# Patient Record
Sex: FEMALE | Race: WHITE | NOT HISPANIC OR LATINO | Employment: UNEMPLOYED | URBAN - METROPOLITAN AREA
[De-identification: names, ages, dates, MRNs, and addresses within clinical notes are randomized per-mention and may not be internally consistent; named-entity substitution may affect disease eponyms.]

---

## 2018-04-11 ENCOUNTER — HISTORICAL (OUTPATIENT)
Dept: SURGERY | Facility: HOSPITAL | Age: 58
End: 2018-04-11

## 2018-04-11 LAB
ABS NEUT (OLG): 2.4 X10(3)/MCL (ref 1.5–6.9)
ALBUMIN SERPL-MCNC: 3.7 GM/DL (ref 3.4–5)
ALBUMIN/GLOB SERPL: 0.9 RATIO
ALP SERPL-CCNC: 54 UNIT/L (ref 30–113)
ALT SERPL-CCNC: 122 UNIT/L (ref 10–45)
APTT PPP: 30.6 SECOND(S) (ref 25–35)
AST SERPL-CCNC: 68 UNIT/L (ref 15–37)
BASOPHILS # BLD AUTO: 0 X10(3)/MCL (ref 0–0.1)
BASOPHILS NFR BLD AUTO: 1 % (ref 0–1)
BILIRUB SERPL-MCNC: 0.4 MG/DL (ref 0.1–0.9)
BILIRUBIN DIRECT+TOT PNL SERPL-MCNC: 0.1 MG/DL (ref 0–0.3)
BILIRUBIN DIRECT+TOT PNL SERPL-MCNC: 0.3 MG/DL
BUN SERPL-MCNC: 10 MG/DL (ref 10–20)
CALCIUM SERPL-MCNC: 8.9 MG/DL (ref 8–10.5)
CHLORIDE SERPL-SCNC: 96 MMOL/L (ref 100–108)
CO2 SERPL-SCNC: 27 MMOL/L (ref 21–35)
CREAT SERPL-MCNC: 0.66 MG/DL (ref 0.7–1.3)
EOSINOPHIL # BLD AUTO: 0.2 X10(3)/MCL (ref 0–0.6)
EOSINOPHIL NFR BLD AUTO: 6 % (ref 0–5)
ERYTHROCYTE [DISTWIDTH] IN BLOOD BY AUTOMATED COUNT: 12.4 % (ref 11.5–17)
GLOBULIN SER-MCNC: 4 GM/DL
GLUCOSE SERPL-MCNC: 102 MG/DL (ref 75–116)
HCT VFR BLD AUTO: 38.6 % (ref 36–48)
HGB BLD-MCNC: 13 GM/DL (ref 12–16)
INR PPP: 1 (ref 0–1.2)
LYMPHOCYTES # BLD AUTO: 1.3 X10(3)/MCL (ref 0.5–4.1)
LYMPHOCYTES NFR BLD AUTO: 30.5 % (ref 15–40)
MCH RBC QN AUTO: 33 PG (ref 27–34)
MCHC RBC AUTO-ENTMCNC: 34 GM/DL (ref 31–36)
MCV RBC AUTO: 98 FL (ref 80–99)
MONOCYTES # BLD AUTO: 0.4 X10(3)/MCL (ref 0–1.1)
MONOCYTES NFR BLD AUTO: 9 % (ref 4–12)
NEUTROPHILS # BLD AUTO: 2.4 X10(3)/MCL (ref 1.5–6.9)
NEUTROPHILS NFR BLD AUTO: 54 % (ref 43–75)
PLATELET # BLD AUTO: 157 X10(3)/MCL (ref 140–400)
PMV BLD AUTO: 9.9 FL (ref 6.8–10)
POTASSIUM SERPL-SCNC: 4 MMOL/L (ref 3.6–5.2)
PROT SERPL-MCNC: 7.7 GM/DL (ref 6.4–8.2)
PROTHROMBIN TIME: 10.6 SECOND(S) (ref 9–12)
RBC # BLD AUTO: 3.96 X10(6)/MCL (ref 4.2–5.4)
SODIUM SERPL-SCNC: 134 MMOL/L (ref 135–145)
WBC # SPEC AUTO: 4.4 X10(3)/MCL (ref 4.5–11.5)

## 2018-04-12 ENCOUNTER — HISTORICAL (OUTPATIENT)
Dept: ANESTHESIOLOGY | Facility: HOSPITAL | Age: 58
End: 2018-04-12

## 2018-08-13 ENCOUNTER — HISTORICAL (OUTPATIENT)
Dept: RADIOLOGY | Facility: HOSPITAL | Age: 58
End: 2018-08-13

## 2018-09-11 ENCOUNTER — HISTORICAL (OUTPATIENT)
Dept: RADIOLOGY | Facility: HOSPITAL | Age: 58
End: 2018-09-11

## 2022-02-14 ENCOUNTER — HISTORICAL (OUTPATIENT)
Dept: LAB | Facility: HOSPITAL | Age: 62
End: 2022-02-14

## 2022-02-14 LAB
ABS NEUT (OLG): 2.2 (ref 1.5–6.9)
ALBUMIN SERPL-MCNC: 4.2 G/DL (ref 3.5–5)
ALBUMIN/GLOB SERPL: 1.3 {RATIO} (ref 1.1–2)
ALP SERPL-CCNC: 62 U/L (ref 40–150)
ALT SERPL-CCNC: 41 U/L (ref 0–55)
AST SERPL-CCNC: 29 U/L (ref 5–34)
BASOPHILS # BLD AUTO: 0 10*3/UL (ref 0–0.1)
BASOPHILS NFR BLD AUTO: 1 % (ref 0–1)
BILIRUB SERPL-MCNC: 0.5 MG/DL
BILIRUBIN DIRECT+TOT PNL SERPL-MCNC: 0.2 (ref 0–0.5)
BILIRUBIN DIRECT+TOT PNL SERPL-MCNC: 0.3 (ref 0–0.8)
BUN SERPL-MCNC: 8 MG/DL (ref 9.8–20.1)
CALCIUM SERPL-MCNC: 9.3 MG/DL (ref 8.7–10.5)
CHLORIDE SERPL-SCNC: 99 MMOL/L (ref 98–107)
CHOLEST SERPL-MCNC: 166 MG/DL
CHOLEST/HDLC SERPL: 4 {RATIO} (ref 0–5)
CO2 SERPL-SCNC: 23 MMOL/L (ref 22–29)
CREAT SERPL-MCNC: 0.77 MG/DL (ref 0.55–1.02)
DEPRECATED CALCIDIOL+CALCIFEROL SERPL-MC: 21.7 NG/ML (ref 30–80)
EOSINOPHIL # BLD AUTO: 0.1 10*3/UL (ref 0–0.6)
EOSINOPHIL NFR BLD AUTO: 2 % (ref 0–5)
ERYTHROCYTE [DISTWIDTH] IN BLOOD BY AUTOMATED COUNT: 13 % (ref 11.5–17)
EST. AVERAGE GLUCOSE BLD GHB EST-MCNC: 93.9 MG/DL
GLOBULIN SER-MCNC: 3.3 G/DL (ref 2.4–3.5)
GLUCOSE SERPL-MCNC: 96 MG/DL (ref 74–100)
HBA1C MFR BLD: 4.9 %
HCT VFR BLD AUTO: 40.6 % (ref 36–48)
HDLC SERPL-MCNC: 43 MG/DL (ref 35–60)
HGB BLD-MCNC: 13.9 G/DL (ref 12–16)
IMM GRANULOCYTES # BLD AUTO: 0.01 10*3/UL (ref 0–0.02)
IMM GRANULOCYTES NFR BLD AUTO: 0.2 % (ref 0–0.43)
LDLC SERPL CALC-MCNC: 101 MG/DL (ref 50–140)
LYMPHOCYTES # BLD AUTO: 1.8 10*3/UL (ref 0.5–4.1)
LYMPHOCYTES NFR BLD AUTO: 39 % (ref 15–40)
MANUAL DIFF? (OHS): NO
MCH RBC QN AUTO: 32 PG (ref 27–34)
MCHC RBC AUTO-ENTMCNC: 34 G/DL (ref 31–36)
MCV RBC AUTO: 92 FL (ref 80–99)
MONOCYTES # BLD AUTO: 0.5 10*3/UL (ref 0–1.1)
MONOCYTES NFR BLD AUTO: 10 % (ref 4–12)
NEUTROPHILS # BLD AUTO: 2.2 10*3/UL (ref 1.5–6.9)
NEUTROPHILS NFR BLD AUTO: 48 % (ref 43–75)
PLATELET # BLD AUTO: 153 10*3/UL (ref 140–400)
PMV BLD AUTO: 9.4 FL (ref 6.8–10)
POTASSIUM SERPL-SCNC: 4.5 MMOL/L (ref 3.5–5.1)
PROT SERPL-MCNC: 7.5 G/DL (ref 6.4–8.3)
RBC # BLD AUTO: 4.41 10*6/UL (ref 4.2–5.4)
SODIUM SERPL-SCNC: 131 MMOL/L (ref 136–145)
TRIGL SERPL-MCNC: 111 MG/DL (ref 37–140)
TSH SERPL-ACNC: 1.91 M[IU]/L (ref 0.35–4.94)
VLDLC SERPL CALC-MCNC: 22 MG/DL
WBC # SPEC AUTO: 4.5 10*3/UL (ref 4.5–11.5)

## 2022-04-26 DIAGNOSIS — R41.3 MEMORY DEFICIT: Primary | ICD-10-CM

## 2022-04-30 NOTE — OP NOTE
ADMITTING DIAGNOSIS:  Callus on the left 5th metatarsal region, plantar surface of the foot.    PROCEDURE PERFORMED:  Wide radical excision of callus on the left plantar surface near the 5th metatarsal consistent with probable hypertrophic callus.    POSTOPERATIVE DIAGNOSIS:  Successful excision of left 5th metatarsal callus.    HISTORY:  Patient is a 57-year-old female with right brain tumor who walks with inversion of her foot.  Patient has hepatitis C and underwent excision of a callus in 2015 of her left foot.  She has redeveloped a callus and now has an area of infection that is causing her pain and tenderness.  She needs debridement of the callus, and over the 5th metatarsal at the plantar surface.    PROCEDURE IN DETAIL:  The patient was brought to the operating room.  Local anesthetic, prepped and draped in a sterile fashion.  Had excision of the callus done with a 15 blade scalpel.  Hemostasis with Bovie cautery.  The entire callus and the deep-seated root of the callus were excised.  Full-thickness skin was excised.  Patient underwent wet-to-dry saline dressing.  Sterile dressings were applied.  No problems were encountered.  Patient tolerated the procedure well.     I appreciate the consultation referral from Dr. Brothers.        BBS/MODL   DD: 04/12/2018 1143   DT: 04/12/2018 1208  Job # [id number]    cc: Cong Brothers M.D.

## 2022-05-30 ENCOUNTER — OFFICE VISIT (OUTPATIENT)
Dept: FAMILY MEDICINE | Facility: CLINIC | Age: 62
End: 2022-05-30
Payer: MEDICAID

## 2022-05-30 VITALS
OXYGEN SATURATION: 96 % | TEMPERATURE: 98 F | BODY MASS INDEX: 25.54 KG/M2 | HEART RATE: 70 BPM | RESPIRATION RATE: 18 BRPM | WEIGHT: 138.81 LBS | SYSTOLIC BLOOD PRESSURE: 120 MMHG | HEIGHT: 62 IN | DIASTOLIC BLOOD PRESSURE: 69 MMHG

## 2022-05-30 DIAGNOSIS — Z09 FOLLOW-UP EXAM: Primary | ICD-10-CM

## 2022-05-30 DIAGNOSIS — R41.89 COGNITIVE DECLINE: ICD-10-CM

## 2022-05-30 DIAGNOSIS — H54.7 POOR VISION: ICD-10-CM

## 2022-05-30 DIAGNOSIS — J30.2 SEASONAL ALLERGIC RHINITIS, UNSPECIFIED TRIGGER: ICD-10-CM

## 2022-05-30 DIAGNOSIS — R35.0 URINARY FREQUENCY: ICD-10-CM

## 2022-05-30 DIAGNOSIS — G89.29 OTHER CHRONIC PAIN: ICD-10-CM

## 2022-05-30 DIAGNOSIS — E55.9 VITAMIN D DEFICIENCY: ICD-10-CM

## 2022-05-30 PROCEDURE — 99213 OFFICE O/P EST LOW 20 MIN: CPT | Mod: S$PBB,,, | Performed by: REGISTERED NURSE

## 2022-05-30 PROCEDURE — 3074F PR MOST RECENT SYSTOLIC BLOOD PRESSURE < 130 MM HG: ICD-10-PCS | Mod: CPTII,,, | Performed by: REGISTERED NURSE

## 2022-05-30 PROCEDURE — 99213 OFFICE O/P EST LOW 20 MIN: CPT | Mod: PBBFAC | Performed by: REGISTERED NURSE

## 2022-05-30 PROCEDURE — 3008F BODY MASS INDEX DOCD: CPT | Mod: CPTII,,, | Performed by: REGISTERED NURSE

## 2022-05-30 PROCEDURE — 3074F SYST BP LT 130 MM HG: CPT | Mod: CPTII,,, | Performed by: REGISTERED NURSE

## 2022-05-30 PROCEDURE — 99999 PR PBB SHADOW E&M-EST. PATIENT-LVL III: CPT | Mod: PBBFAC,,, | Performed by: REGISTERED NURSE

## 2022-05-30 PROCEDURE — 3078F PR MOST RECENT DIASTOLIC BLOOD PRESSURE < 80 MM HG: ICD-10-PCS | Mod: CPTII,,, | Performed by: REGISTERED NURSE

## 2022-05-30 PROCEDURE — 1160F PR REVIEW ALL MEDS BY PRESCRIBER/CLIN PHARMACIST DOCUMENTED: ICD-10-PCS | Mod: CPTII,,, | Performed by: REGISTERED NURSE

## 2022-05-30 PROCEDURE — 1159F MED LIST DOCD IN RCRD: CPT | Mod: CPTII,,, | Performed by: REGISTERED NURSE

## 2022-05-30 PROCEDURE — 1159F PR MEDICATION LIST DOCUMENTED IN MEDICAL RECORD: ICD-10-PCS | Mod: CPTII,,, | Performed by: REGISTERED NURSE

## 2022-05-30 PROCEDURE — 3078F DIAST BP <80 MM HG: CPT | Mod: CPTII,,, | Performed by: REGISTERED NURSE

## 2022-05-30 PROCEDURE — 99999 PR PBB SHADOW E&M-EST. PATIENT-LVL III: ICD-10-PCS | Mod: PBBFAC,,, | Performed by: REGISTERED NURSE

## 2022-05-30 PROCEDURE — 99213 PR OFFICE/OUTPT VISIT, EST, LEVL III, 20-29 MIN: ICD-10-PCS | Mod: S$PBB,,, | Performed by: REGISTERED NURSE

## 2022-05-30 PROCEDURE — 3008F PR BODY MASS INDEX (BMI) DOCUMENTED: ICD-10-PCS | Mod: CPTII,,, | Performed by: REGISTERED NURSE

## 2022-05-30 PROCEDURE — 1160F RVW MEDS BY RX/DR IN RCRD: CPT | Mod: CPTII,,, | Performed by: REGISTERED NURSE

## 2022-05-30 RX ORDER — IBUPROFEN 600 MG/1
600 TABLET ORAL EVERY 8 HOURS PRN
Qty: 30 TABLET | Refills: 1 | Status: CANCELLED | OUTPATIENT
Start: 2022-05-30

## 2022-05-30 RX ORDER — TRAZODONE HYDROCHLORIDE 50 MG/1
50 TABLET ORAL DAILY
COMMUNITY
Start: 2022-05-17 | End: 2022-10-05 | Stop reason: SDUPTHER

## 2022-05-30 RX ORDER — LEVOCETIRIZINE DIHYDROCHLORIDE 5 MG/1
5 TABLET, FILM COATED ORAL NIGHTLY
COMMUNITY
Start: 2022-05-17 | End: 2022-05-30 | Stop reason: ALTCHOICE

## 2022-05-30 RX ORDER — OXYBUTYNIN CHLORIDE 5 MG/1
5 TABLET, EXTENDED RELEASE ORAL EVERY MORNING
COMMUNITY
Start: 2022-05-17 | End: 2022-10-05 | Stop reason: SDUPTHER

## 2022-05-30 RX ORDER — IBUPROFEN 600 MG/1
600 TABLET ORAL EVERY 8 HOURS PRN
Qty: 60 TABLET | Refills: 1 | Status: SHIPPED | OUTPATIENT
Start: 2022-05-30 | End: 2022-07-29

## 2022-05-30 RX ORDER — MONTELUKAST SODIUM 10 MG/1
10 TABLET ORAL DAILY
COMMUNITY
Start: 2022-03-08 | End: 2022-05-30 | Stop reason: ALTCHOICE

## 2022-05-30 RX ORDER — IBUPROFEN 600 MG/1
600 TABLET ORAL DAILY
COMMUNITY
Start: 2022-03-08 | End: 2022-05-30

## 2022-05-30 RX ORDER — CETIRIZINE HYDROCHLORIDE 10 MG/1
10 TABLET ORAL DAILY
Qty: 30 TABLET | Refills: 3 | Status: SHIPPED | OUTPATIENT
Start: 2022-05-30 | End: 2022-10-05 | Stop reason: SDUPTHER

## 2022-05-30 NOTE — PROGRESS NOTES
Subjective:       Patient ID: Avelina David is a 62 y.o. female.    Chief Complaint: 4mth f/u and sinus drainage     Patient is a 62-year-old female here today for follow-up visit.  Patient complaining of left foot pain x1 month.  Patient stated plantar wart may have returned.  Patient denies injury or trauma to the area, patient rates pain 5/10 on pain scale and pain is relieved with Motrin.     Review of Systems   Constitutional: Negative.    HENT: Negative.    Eyes: Negative.    Respiratory: Negative.    Cardiovascular: Negative.    Gastrointestinal: Negative.    Endocrine: Negative.    Genitourinary: Positive for frequency.   Musculoskeletal: Positive for arthralgias and gait problem.   Integumentary:  Negative.   Allergic/Immunologic: Positive for environmental allergies.   Neurological: Positive for memory loss.   Hematological: Negative.    Psychiatric/Behavioral: Negative.          Objective:      Physical Exam  Constitutional:       Appearance: Normal appearance.   HENT:      Head: Normocephalic and atraumatic.      Nose: Nose normal.      Mouth/Throat:      Mouth: Mucous membranes are moist.   Eyes:      Pupils: Pupils are equal, round, and reactive to light.   Cardiovascular:      Rate and Rhythm: Normal rate and regular rhythm.      Pulses: Normal pulses.      Heart sounds: Normal heart sounds.   Pulmonary:      Effort: Pulmonary effort is normal.      Breath sounds: Normal breath sounds.   Abdominal:      General: Abdomen is flat. Bowel sounds are normal.      Palpations: Abdomen is soft.   Musculoskeletal:         General: Tenderness present.      Cervical back: Normal range of motion and neck supple.   Skin:     General: Skin is warm and dry.      Capillary Refill: Capillary refill takes less than 2 seconds.   Neurological:      Mental Status: She is alert. Mental status is at baseline.   Psychiatric:         Mood and Affect: Mood normal.         Behavior: Behavior normal.         Assessment:        Problem List Items Addressed This Visit        Neuro    Cognitive decline    Relevant Orders    Sodium    Chronic pain       Ophtho    Poor vision       Renal/    Urinary frequency       Endocrine    Vitamin D deficiency      Other Visit Diagnoses     Follow-up exam    -  Primary    Seasonal allergic rhinitis, unspecified trigger              Plan:   Zyrtec 10 mg p.o. q.day since the pharmacy.  Patient refused mammogram and colonoscopy at this time.  Next neurologist appointment is on July 1st, instructed patient to keep scheduled appointment.  Motrin 600 mg p.o. Q 8 hours p.r.n. for pain sent to pharmacy.  Patient stated she will call and schedule appointment with  for left foot pain.  Sodium level ordered at this visit, will call patient with results.  Return to clinic p.r.n./6 months.

## 2022-06-24 ENCOUNTER — TELEPHONE (OUTPATIENT)
Dept: NEUROLOGY | Facility: CLINIC | Age: 62
End: 2022-06-24
Payer: MEDICAID

## 2022-06-24 NOTE — TELEPHONE ENCOUNTER
----- Message from Leelee Quiñonez sent at 6/23/2022  2:14 PM CDT -----  Regarding: records included with referral  Pt's caregiver Allyssa [phone] called to verify appt and asked if the referring provider had records of the pt's brain tumor included with referral. I am not seeing anything in referral in appt area. Please verify that all information is included as they don't want have to come here for appt and then wait on more records.         Thank You  Nessa MCCLENDON

## 2022-06-30 PROBLEM — M81.0 OSTEOPOROSIS: Status: ACTIVE | Noted: 2022-06-30

## 2022-06-30 PROBLEM — Z72.0 TOBACCO USER: Status: ACTIVE | Noted: 2022-06-30

## 2022-06-30 NOTE — PROGRESS NOTES
[facility] Neurology initial Office Visit Note    Initial Visit Date:  July 1, 2022  Current Visit Date:  07/01/2022      Chief Complaint:     Chief Complaint   Patient presents with    Cognitive Decline     Patient is having trouble walking foot is continuing to turn       History of Present Illness:      This is a 62 y.o. 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  Caretaker noted that she has been having trouble with remembering appointments and misplacing things since her surgery.  Patient noted she had 1 seizure post resection but that has not recurred.  Today, her main complaint is that she is unable to walk on her left leg due to spasticity ever since the surgery.  She reports severe muscle cramps in her left leg.    Age of Onset: since resection     Progression: keeping up with appointments, misplacing things. Denied confusing names and faces.     Instrumental Activities of Daily Living (IADL)  Cooking: Partial assistance  Cleaning: Partial assistance  Taking Medications: Partial assistance  Transportation: Total assistance  Laundry:  Partial assistance  Shopping:  Partial assistance  Communications: Partial assistance    Basic Activities of Daily Living (ADL)  Eating: Self-care  Bathing: Partial assistance  Dressing: Partial assistance  Transferring: Self-care  Toiletry/Personal Hygiene: Self-care  Ambulation Status: Partial assistance    Medications:     non    Labs:     Results for orders placed or performed in visit on 02/14/22   CBC Auto Differential   Result Value Ref Range    WBC 4.5 4.5 - 11.5    RBC 4.41 4.20 - 5.40    Hgb 13.9 12.0 - 16.0    Hct 40.6 36.0 - 48.0    MCV 92 80 - 99    MCH 32 27 - 34    MCHC 34 31 - 36    RDW 13.0 11.5 - 17.0    Platelet 153 140 - 400    MPV 9.4 6.8 - 10.0    Abs Neut 2.2 1.5 - 6.9    Manual Diff? No    Differential   Result Value Ref Range    Neut % 48 43 - 75    Lymph % 39 15  - 40    Mono % 10 4 - 12    Eos % 2 0 - 5    Basophil % 1 0 - 1    Lymph # 1.8 0.5 - 4.1    Neut # 2.2 1.5 - 6.9    Mono # 0.5 0.0 - 1.1    Eos # 0.1 0.0 - 0.6    Baso # 0.0 0.0 - 0.1    IG# 0.0100 0.0000 - 0.0155    IG% 0.200 0.000 - 0.430   Hemoglobin A1C   Result Value Ref Range    Hemoglobin A1c 4.9 <=7.0    Estimated Average Glucose 93.9    Comprehensive Metabolic Panel   Result Value Ref Range    Sodium Level 131 136 - 145    Potassium Level 4.5 3.5 - 5.1    Chloride 99 98 - 107    Carbon Dioxide 23 22 - 29    Glucose Level 96 74 - 100    Blood Urea Nitrogen 8.0 9.8 - 20.1    Creatinine 0.77 0.55 - 1.02    Calcium Level Total 9.3 8.7 - 10.5    Albumin Level 4.2 3.5 - 5.0    Protein Total 7.5 6.4 - 8.3    Globulin 3.3 2.4 - 3.5    Albumin/Globulin Ratio 1.3 1.1 - 2.0    Alkaline Phosphatase 62 40 - 150    Bilirubin Total 0.5 <=1.5    Bilirubin Direct 0.2 0.0 - 0.5    Bilirubin Indirect 0.30 0.00 - 0.80    Aspartate Aminotransferase 29 5 - 34    Alanine Aminotransferase 41 0 - 55   Lipid Panel   Result Value Ref Range    Cholesterol Total 166 <=200    HDL Cholesterol 43 35 - 60    Triglyceride 111 37 - 140    Very Low Density Lipoprotein 22     Cholesterol/HDL Ratio 4 0 - 5    LDL Cholesterol 101.00 50.00 - 140.00   GFR, Estimated   Result Value Ref Range    Estimated GFR- >60     Estimated GFR-Non African American >60    TSH   Result Value Ref Range    Thyroid Stimulating Hormone 1.9071 0.3500 - 4.9400   Vitamin D   Result Value Ref Range    Vit D 25 OH 21.7 30.0 - 80.0       Studies:     MRI Brain with and without contrast September 11, 2018: I have reviewed the study independently and with the patient.  Right temporoparietal occipital encephalomalacia with ex vacuo dilation of the posterior horn of right lateral ventricle.  There is mild heterogenous enhancement at the anterior proximal margin and a small residual lesion component cannot be excluded.  Prior shunt hope noted in right occipital  subcutaneous regions.    Review of Systems:     Review of Systems   All other systems reviewed and are negative.      Physical Exams:     Vitals:    07/01/22 1119   BP: 105/70   Pulse: 85   Resp: 20   Temp: 97.9 °F (36.6 °C)       Physical Exam  Vitals and nursing note reviewed.   Constitutional:       Appearance: Normal appearance.   HENT:      Head: Normocephalic and atraumatic.      Nose: Nose normal.      Mouth/Throat:      Mouth: Mucous membranes are moist.      Pharynx: Oropharynx is clear.   Eyes:      Conjunctiva/sclera: Conjunctivae normal.   Cardiovascular:      Rate and Rhythm: Normal rate and regular rhythm.      Pulses: Normal pulses.   Pulmonary:      Effort: Pulmonary effort is normal.      Breath sounds: Normal breath sounds.   Abdominal:      General: Abdomen is flat.   Musculoskeletal:         General: Normal range of motion.      Cervical back: Normal range of motion.   Skin:     General: Skin is warm.   Neurological:      Mental Status: She is alert.         Comprehensive Neurological Exam:  Mental Status:  No dysarthria.   CN II - XII: KENZIE, No APD, Fundus wnl OU, VA grossly intact to finger counting at 6 ft, incongruous homonymous  left hemianopia, No ptosis OU, EOMI without nystagmus, LT/Temp symmetric in CN V1-3 distribution, Hearing grossly intact, Face Symmetric, Tongue and Uvula midline, Trapezius symmetric bilateral.   Motor: tone slightly increased in left upper extremity, dystonic involuntary left foot inversion and plantar for flexion, along with big toe extension, worse with use, tight heel cord in the left, and bulk wnl throughout, no abnormal involuntary or voluntary movements, 5/5 to confrontation except for 4+/5 in left upper extremity,  1/5 in left tibialis anterior and and posterior fibularis, Fine finger movements diminished dexterity in left hand, left pronator drift.   Sensory: LT, Proprioception, Vibration symmetric  Cerebellar: FNF wnl b/l  Gait:  Left foot dystonia with  severe left foot inversion and plantar flexion.    Assessment:     This is a 62 y.o. 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  Will need to screen for electrographical seizures given self-reported history of 1 lifetime seizure event after resection.  Will need to monitor tumor margin for MRI brain with and without contrast.  Patient will benefit from Botox injections for left foot dystonia.    Problem List Items Addressed This Visit        Neuro    Focal dystonia (Chronic)    Relevant Medications    onabotulinumtoxina injection 400 Units (Start on 7/28/2022 12:00 AM)    Other Relevant Orders    CBC Auto Differential    Comprehensive Metabolic Panel    Vitamin B12    Folate    TSH    T4, Free    SYPHILIS ANTIBODY (WITH REFLEX RPR)    MRI Brain W WO Contrast    EEG    RESOLVED: Spastic hemiplegia of left nondominant side due to noncerebrovascular etiology - Primary (Chronic)    Relevant Medications    onabotulinumtoxina injection 400 Units (Start on 7/28/2022 12:00 AM)    Cognitive decline    Relevant Medications    onabotulinumtoxina injection 400 Units (Start on 7/28/2022 12:00 AM)    Other Relevant Orders    CBC Auto Differential    Comprehensive Metabolic Panel    Vitamin B12    Folate    TSH    T4, Free    SYPHILIS ANTIBODY (WITH REFLEX RPR)    MRI Brain W WO Contrast    EEG       Ophtho    Poor vision    Relevant Medications    onabotulinumtoxina injection 400 Units (Start on 7/28/2022 12:00 AM)       Oncology    Ependymoma    Relevant Medications    onabotulinumtoxina injection 400 Units (Start on 7/28/2022 12:00 AM)    Other Relevant Orders    CBC Auto Differential    Comprehensive Metabolic Panel    Vitamin B12    Folate    TSH    T4, Free    SYPHILIS ANTIBODY  (WITH REFLEX RPR)    MRI Brain W WO Contrast    EEG       Other    Tobacco user    Relevant Medications    onabotulinumtoxina injection 400 Units (Start on 7/28/2022 12:00 AM)    Other Relevant Orders    CBC Auto Differential    Comprehensive Metabolic Panel    Vitamin B12    Folate    TSH    T4, Free    SYPHILIS ANTIBODY (WITH REFLEX RPR)    MRI Brain W WO Contrast    EEG      Other Visit Diagnoses     Memory deficit        Relevant Medications    onabotulinumtoxina injection 400 Units (Start on 7/28/2022 12:00 AM)    Other Relevant Orders    CBC Auto Differential    Comprehensive Metabolic Panel    Vitamin B12    Folate    TSH    T4, Free    SYPHILIS ANTIBODY (WITH REFLEX RPR)    MRI Brain W WO Contrast    EEG            Plan:     [] Memory loss labs  [] Electroencephalogram  [] Repeat MRI brain with and without contrast  [] Patient instructed to avoid NSAIDs 3 days prior to Botox injection  [] Botox LLE protocol with EMG guidance:  Left tibialis posterior 90 units divided in 3 sites  Left gastrocnemius lateral head 75 units divided in 3 sites  Left gastrocnemius medial head 75 units divided in 3 sites  Left soleus 75 units divided in 3 sites    Total units: 315 units    RTC Botox    I have explained the treatment plan, diagnosis, and prognosis to patient. All questions are answered to the best of my knowledge.       Face to face time 60 minutes, including documentation, chart review, counseling, education, review of test results, relevant medical records, and coordination of care.       Ani Garcia MD   General Neurology  07/01/2022

## 2022-07-01 ENCOUNTER — OFFICE VISIT (OUTPATIENT)
Dept: NEUROLOGY | Facility: CLINIC | Age: 62
End: 2022-07-01
Payer: MEDICAID

## 2022-07-01 VITALS
OXYGEN SATURATION: 98 % | TEMPERATURE: 98 F | WEIGHT: 137.19 LBS | DIASTOLIC BLOOD PRESSURE: 70 MMHG | HEART RATE: 85 BPM | RESPIRATION RATE: 20 BRPM | BODY MASS INDEX: 25.25 KG/M2 | SYSTOLIC BLOOD PRESSURE: 105 MMHG | HEIGHT: 62 IN

## 2022-07-01 DIAGNOSIS — H53.462 LEFT HOMONYMOUS HEMIANOPSIA: ICD-10-CM

## 2022-07-01 DIAGNOSIS — R41.89 COGNITIVE DECLINE: ICD-10-CM

## 2022-07-01 DIAGNOSIS — R41.3 MEMORY DEFICIT: ICD-10-CM

## 2022-07-01 DIAGNOSIS — G81.10 SPASTIC HEMIPARESIS AFFECTING NONDOMINANT SIDE: Primary | ICD-10-CM

## 2022-07-01 DIAGNOSIS — G24.8 FOCAL DYSTONIA: ICD-10-CM

## 2022-07-01 DIAGNOSIS — Z72.0 TOBACCO USER: ICD-10-CM

## 2022-07-01 DIAGNOSIS — C71.9 EPENDYMOMA: ICD-10-CM

## 2022-07-01 PROBLEM — G81.14 SPASTIC HEMIPLEGIA OF LEFT NONDOMINANT SIDE DUE TO NONCEREBROVASCULAR ETIOLOGY: Chronic | Status: RESOLVED | Noted: 2022-07-01 | Resolved: 2022-07-01

## 2022-07-01 PROBLEM — G81.12: Chronic | Status: ACTIVE | Noted: 2022-07-01

## 2022-07-01 PROBLEM — G81.14 SPASTIC HEMIPLEGIA OF LEFT NONDOMINANT SIDE DUE TO NONCEREBROVASCULAR ETIOLOGY: Chronic | Status: ACTIVE | Noted: 2022-07-01

## 2022-07-01 PROCEDURE — 3074F SYST BP LT 130 MM HG: CPT | Mod: CPTII,,, | Performed by: PSYCHIATRY & NEUROLOGY

## 2022-07-01 PROCEDURE — 3074F PR MOST RECENT SYSTOLIC BLOOD PRESSURE < 130 MM HG: ICD-10-PCS | Mod: CPTII,,, | Performed by: PSYCHIATRY & NEUROLOGY

## 2022-07-01 PROCEDURE — 1159F PR MEDICATION LIST DOCUMENTED IN MEDICAL RECORD: ICD-10-PCS | Mod: CPTII,,, | Performed by: PSYCHIATRY & NEUROLOGY

## 2022-07-01 PROCEDURE — 99205 PR OFFICE/OUTPT VISIT, NEW, LEVL V, 60-74 MIN: ICD-10-PCS | Mod: S$PBB,,, | Performed by: PSYCHIATRY & NEUROLOGY

## 2022-07-01 PROCEDURE — 3078F PR MOST RECENT DIASTOLIC BLOOD PRESSURE < 80 MM HG: ICD-10-PCS | Mod: CPTII,,, | Performed by: PSYCHIATRY & NEUROLOGY

## 2022-07-01 PROCEDURE — 3078F DIAST BP <80 MM HG: CPT | Mod: CPTII,,, | Performed by: PSYCHIATRY & NEUROLOGY

## 2022-07-01 PROCEDURE — 99205 OFFICE O/P NEW HI 60 MIN: CPT | Mod: S$PBB,,, | Performed by: PSYCHIATRY & NEUROLOGY

## 2022-07-01 PROCEDURE — 3008F BODY MASS INDEX DOCD: CPT | Mod: CPTII,,, | Performed by: PSYCHIATRY & NEUROLOGY

## 2022-07-01 PROCEDURE — 99214 OFFICE O/P EST MOD 30 MIN: CPT | Mod: PBBFAC | Performed by: PSYCHIATRY & NEUROLOGY

## 2022-07-01 PROCEDURE — 3008F PR BODY MASS INDEX (BMI) DOCUMENTED: ICD-10-PCS | Mod: CPTII,,, | Performed by: PSYCHIATRY & NEUROLOGY

## 2022-07-01 PROCEDURE — 1159F MED LIST DOCD IN RCRD: CPT | Mod: CPTII,,, | Performed by: PSYCHIATRY & NEUROLOGY

## 2022-07-01 RX ORDER — TIZANIDINE 4 MG/1
4 TABLET ORAL 3 TIMES DAILY
COMMUNITY
Start: 2022-06-14 | End: 2023-11-21

## 2022-07-04 ENCOUNTER — PATIENT MESSAGE (OUTPATIENT)
Dept: ADMINISTRATIVE | Facility: HOSPITAL | Age: 62
End: 2022-07-04
Payer: MEDICAID

## 2022-08-05 ENCOUNTER — TELEPHONE (OUTPATIENT)
Dept: NEUROLOGY | Facility: CLINIC | Age: 62
End: 2022-08-05
Payer: MEDICAID

## 2022-08-05 NOTE — TELEPHONE ENCOUNTER
Notified Ms Michelle, care giver, MRI orders and documentatio have been faxed to [facility]. Ms Michelle verbalized understanding.

## 2022-08-05 NOTE — TELEPHONE ENCOUNTER
----- Message from Bertha Kwong sent at 8/5/2022  8:55 AM CDT -----  Michelle Ordoñez pt caregiver called to see if order for MRI can be sent to [facility]   (Fax [phone]) because it is closer for them.   Pt # [phone]

## 2022-08-15 ENCOUNTER — HISTORICAL (OUTPATIENT)
Dept: ADMINISTRATIVE | Facility: HOSPITAL | Age: 62
End: 2022-08-15

## 2022-10-05 DIAGNOSIS — T78.40XA ALLERGY, INITIAL ENCOUNTER: ICD-10-CM

## 2022-10-05 DIAGNOSIS — F32.A DEPRESSION, UNSPECIFIED DEPRESSION TYPE: Primary | ICD-10-CM

## 2022-10-05 DIAGNOSIS — R35.0 URINARY FREQUENCY: ICD-10-CM

## 2022-10-05 RX ORDER — TRAZODONE HYDROCHLORIDE 50 MG/1
50 TABLET ORAL DAILY
Qty: 30 TABLET | Refills: 6 | Status: SHIPPED | OUTPATIENT
Start: 2022-10-05 | End: 2023-02-13 | Stop reason: SDUPTHER

## 2022-10-05 RX ORDER — OXYBUTYNIN CHLORIDE 5 MG/1
5 TABLET, EXTENDED RELEASE ORAL EVERY MORNING
Qty: 30 TABLET | Refills: 6 | Status: SHIPPED | OUTPATIENT
Start: 2022-10-05 | End: 2023-02-13 | Stop reason: SDUPTHER

## 2022-10-05 RX ORDER — CETIRIZINE HYDROCHLORIDE 10 MG/1
10 TABLET ORAL DAILY
Qty: 30 TABLET | Refills: 3 | Status: SHIPPED | OUTPATIENT
Start: 2022-10-05 | End: 2023-02-13 | Stop reason: SDUPTHER

## 2022-10-05 NOTE — TELEPHONE ENCOUNTER
Let patient know that her medications have been refilled and she can just get an over the counter Vit D, she know longer needs the prescription strength Vit D, thanks

## 2022-10-26 DIAGNOSIS — Z72.0 TOBACCO USER: ICD-10-CM

## 2022-10-26 DIAGNOSIS — H53.462 LEFT HOMONYMOUS HEMIANOPSIA: ICD-10-CM

## 2022-10-26 DIAGNOSIS — G81.10 SPASTIC HEMIPARESIS AFFECTING NONDOMINANT SIDE: ICD-10-CM

## 2022-10-26 DIAGNOSIS — C71.9 EPENDYMOMA: ICD-10-CM

## 2022-10-26 DIAGNOSIS — R41.3 MEMORY DEFICIT: ICD-10-CM

## 2022-10-26 DIAGNOSIS — R41.89 COGNITIVE DECLINE: ICD-10-CM

## 2022-10-26 DIAGNOSIS — G24.8 FOCAL DYSTONIA: ICD-10-CM

## 2022-11-01 ENCOUNTER — OUTSIDE PLACE OF SERVICE (OUTPATIENT)
Dept: NEUROLOGY | Facility: CLINIC | Age: 62
End: 2022-11-01
Payer: MEDICAID

## 2022-11-01 DIAGNOSIS — D49.6 BRAIN TUMOR: Primary | ICD-10-CM

## 2022-11-01 PROCEDURE — 95819 PR EEG,W/AWAKE & ASLEEP RECORD: ICD-10-PCS | Mod: 26,S$PBB,, | Performed by: SPECIALIST

## 2022-11-01 PROCEDURE — 95819 EEG AWAKE AND ASLEEP: CPT | Mod: 26,S$PBB,, | Performed by: SPECIALIST

## 2023-02-08 ENCOUNTER — TELEPHONE (OUTPATIENT)
Dept: NEUROLOGY | Facility: CLINIC | Age: 63
End: 2023-02-08
Payer: MEDICAID

## 2023-02-08 NOTE — TELEPHONE ENCOUNTER
Patient is requesting MRI results which was completed on 8/15/2022.    She currently does not have any appointments scheduled with neurology.    Ms Quan can be reached at [phone].    Thank you

## 2023-02-08 NOTE — TELEPHONE ENCOUNTER
----- Message from Leelee Quiñonez sent at 2/8/2023  2:49 PM CST -----  Regarding: MRI results  Laura salamanca/Spencer Foreman in [place] pt's PCP called and stated pt is wanting her results of MRI done in Aug 2022. Pt was seen 7/1/22 and canceled her 10/27/22 botox appt. Pt can be reached @ [phone]        Thank You  Nessa MCCLENDON

## 2023-02-09 NOTE — TELEPHONE ENCOUNTER
Spoke with the patient and her caregiver, her caregiver mentioned that she did a EEG recently to the hospital in [place] and they are requesting if we have the results back from those labs.

## 2023-02-13 ENCOUNTER — OFFICE VISIT (OUTPATIENT)
Dept: FAMILY MEDICINE | Facility: CLINIC | Age: 63
End: 2023-02-13
Payer: MEDICAID

## 2023-02-13 VITALS
HEIGHT: 62 IN | HEART RATE: 62 BPM | OXYGEN SATURATION: 97 % | BODY MASS INDEX: 25.7 KG/M2 | WEIGHT: 139.63 LBS | DIASTOLIC BLOOD PRESSURE: 69 MMHG | RESPIRATION RATE: 18 BRPM | SYSTOLIC BLOOD PRESSURE: 131 MMHG

## 2023-02-13 DIAGNOSIS — T78.40XA ALLERGY, INITIAL ENCOUNTER: ICD-10-CM

## 2023-02-13 DIAGNOSIS — F32.A DEPRESSION, UNSPECIFIED DEPRESSION TYPE: ICD-10-CM

## 2023-02-13 DIAGNOSIS — R35.0 URINARY FREQUENCY: ICD-10-CM

## 2023-02-13 DIAGNOSIS — L60.2 THICKENED NAIL: Primary | ICD-10-CM

## 2023-02-13 DIAGNOSIS — Z00.00 WELLNESS EXAMINATION: ICD-10-CM

## 2023-02-13 PROCEDURE — 3075F SYST BP GE 130 - 139MM HG: CPT | Mod: CPTII,,, | Performed by: REGISTERED NURSE

## 2023-02-13 PROCEDURE — 3078F DIAST BP <80 MM HG: CPT | Mod: CPTII,,, | Performed by: REGISTERED NURSE

## 2023-02-13 PROCEDURE — 3008F BODY MASS INDEX DOCD: CPT | Mod: CPTII,,, | Performed by: REGISTERED NURSE

## 2023-02-13 PROCEDURE — 3075F PR MOST RECENT SYSTOLIC BLOOD PRESS GE 130-139MM HG: ICD-10-PCS | Mod: CPTII,,, | Performed by: REGISTERED NURSE

## 2023-02-13 PROCEDURE — 3078F PR MOST RECENT DIASTOLIC BLOOD PRESSURE < 80 MM HG: ICD-10-PCS | Mod: CPTII,,, | Performed by: REGISTERED NURSE

## 2023-02-13 PROCEDURE — 99999 PR PBB SHADOW E&M-EST. PATIENT-LVL IV: ICD-10-PCS | Mod: PBBFAC,,, | Performed by: REGISTERED NURSE

## 2023-02-13 PROCEDURE — 99214 OFFICE O/P EST MOD 30 MIN: CPT | Mod: PBBFAC | Performed by: REGISTERED NURSE

## 2023-02-13 PROCEDURE — 99214 OFFICE O/P EST MOD 30 MIN: CPT | Mod: S$PBB,,, | Performed by: REGISTERED NURSE

## 2023-02-13 PROCEDURE — 99999 PR PBB SHADOW E&M-EST. PATIENT-LVL IV: CPT | Mod: PBBFAC,,, | Performed by: REGISTERED NURSE

## 2023-02-13 PROCEDURE — 1159F PR MEDICATION LIST DOCUMENTED IN MEDICAL RECORD: ICD-10-PCS | Mod: CPTII,,, | Performed by: REGISTERED NURSE

## 2023-02-13 PROCEDURE — 3008F PR BODY MASS INDEX (BMI) DOCUMENTED: ICD-10-PCS | Mod: CPTII,,, | Performed by: REGISTERED NURSE

## 2023-02-13 PROCEDURE — 99214 PR OFFICE/OUTPT VISIT, EST, LEVL IV, 30-39 MIN: ICD-10-PCS | Mod: S$PBB,,, | Performed by: REGISTERED NURSE

## 2023-02-13 PROCEDURE — 1159F MED LIST DOCD IN RCRD: CPT | Mod: CPTII,,, | Performed by: REGISTERED NURSE

## 2023-02-13 RX ORDER — OXYBUTYNIN CHLORIDE 5 MG/1
5 TABLET, EXTENDED RELEASE ORAL EVERY MORNING
Qty: 30 TABLET | Refills: 6 | Status: SHIPPED | OUTPATIENT
Start: 2023-02-13 | End: 2023-11-21 | Stop reason: SDUPTHER

## 2023-02-13 RX ORDER — CETIRIZINE HYDROCHLORIDE 10 MG/1
10 TABLET ORAL DAILY
Qty: 30 TABLET | Refills: 3 | Status: SHIPPED | OUTPATIENT
Start: 2023-02-13 | End: 2023-07-03

## 2023-02-13 RX ORDER — TRAZODONE HYDROCHLORIDE 50 MG/1
50 TABLET ORAL DAILY
Qty: 30 TABLET | Refills: 6 | Status: SHIPPED | OUTPATIENT
Start: 2023-02-13 | End: 2023-11-21 | Stop reason: SDUPTHER

## 2023-02-13 NOTE — PROGRESS NOTES
Subjective:       Patient ID: Avelina David is a 62 y.o. female.    Chief Complaint: Medication Refill (Medication refill/Podiatry Referral (Dr. Manzano))    Patient is a 62-year-old female here today for medication refill, and complaining of bilateral toenail thickness.  Patient requesting podiatry referral  Review of Systems   Musculoskeletal:  Positive for gait problem.   All other systems reviewed and are negative.      Objective:      Physical Exam  Vitals reviewed.   Constitutional:       Appearance: Normal appearance.   HENT:      Head: Normocephalic.      Right Ear: Tympanic membrane normal.      Left Ear: Tympanic membrane normal.      Nose: Nose normal.      Mouth/Throat:      Mouth: Mucous membranes are moist.   Eyes:      Pupils: Pupils are equal, round, and reactive to light.   Cardiovascular:      Rate and Rhythm: Normal rate and regular rhythm.      Pulses: Normal pulses.      Heart sounds: Normal heart sounds.   Pulmonary:      Effort: Pulmonary effort is normal.      Breath sounds: Normal breath sounds.   Abdominal:      General: Abdomen is flat.   Musculoskeletal:         General: Deformity present.      Cervical back: Normal range of motion and neck supple.      Left foot: Decreased range of motion. Deformity present.      Comments: L foot inversion   Feet:      Right foot:      Toenail Condition: Right toenails are abnormally thick.      Left foot:      Toenail Condition: Left toenails are abnormally thick.   Skin:     General: Skin is warm.      Capillary Refill: Capillary refill takes less than 2 seconds.   Neurological:      General: No focal deficit present.      Mental Status: She is alert and oriented to person, place, and time.   Psychiatric:         Mood and Affect: Mood normal.         Behavior: Behavior normal.       Assessment:       Problem List Items Addressed This Visit          Renal/    Urinary frequency    Relevant Medications    oxybutynin (DITROPAN-XL) 5 MG TR24     Other  Visit Diagnoses       Allergy, initial encounter        Relevant Medications    cetirizine (ZYRTEC) 10 MG tablet    Depression, unspecified depression type        Relevant Medications    traZODone (DESYREL) 50 MG tablet        I spent a total of 30 minutes on the day of the visit.This includes face to face time and non-face to face time preparing to see the patient (eg, review of tests), obtaining and/or reviewing separately obtained history, documenting clinical information in the electronic or other health record, independently interpreting results and communicating results to the patient/family/caregiver, or care coordinator.     Plan:   Medications refilled today, take as previously prescribed.  Podiatry referral sent today.  Return to clinic in May for wellness exam, labs to be completed prior to visit.

## 2023-02-22 ENCOUNTER — TELEPHONE (OUTPATIENT)
Dept: FAMILY MEDICINE | Facility: CLINIC | Age: 63
End: 2023-02-22
Payer: MEDICAID

## 2023-02-22 NOTE — TELEPHONE ENCOUNTER
Spoke with Ms. Ordoñez (the pt's caregiver) and notified her of the status of the podiatrist referral.

## 2023-02-22 NOTE — TELEPHONE ENCOUNTER
----- Message from [facility] sent at 2/22/2023  9:56 AM CST -----  Regarding: foot doctor  .Type:  Needs Medical Advice    Who Called:  Allyssa Ordoñez pt's caregiver    Would the patient rather a call back? Yes    Best Call Back Number: [phone]    Additional Information:  wanting to know if we found her a foot doctor podiatrist

## 2023-03-01 ENCOUNTER — TELEPHONE (OUTPATIENT)
Dept: FAMILY MEDICINE | Facility: CLINIC | Age: 63
End: 2023-03-01
Payer: MEDICAID

## 2023-03-01 NOTE — TELEPHONE ENCOUNTER
----- Message from Molly Luque LPN sent at 2/28/2023 11:53 AM CST -----  Regarding: FW: foot pain waiting on referral    ----- Message -----  From: Tasha Peace  Sent: 2/28/2023  10:57 AM CST  To: [facility] Staff  Subject: foot pain waiting on referral                    .Type:  Needs Medical Advice    Who Called:  pt    Symptoms (please be specific):  foot pain     How long has patient had these symptoms:  over a year     Would the patient rather a call back? Yes    Best Call Back Number: [phone]    Additional Information:  pt is waiting on a referral for a foot doctor in [place] her foot is still in lots of pain please call she can not walk on it shooting pain from toes to ankles

## 2023-04-03 ENCOUNTER — PATIENT MESSAGE (OUTPATIENT)
Dept: ADMINISTRATIVE | Facility: HOSPITAL | Age: 63
End: 2023-04-03
Payer: MEDICAID

## 2023-05-01 ENCOUNTER — PATIENT MESSAGE (OUTPATIENT)
Dept: ADMINISTRATIVE | Facility: HOSPITAL | Age: 63
End: 2023-05-01
Payer: MEDICAID

## 2023-05-30 ENCOUNTER — PATIENT MESSAGE (OUTPATIENT)
Dept: ADMINISTRATIVE | Facility: HOSPITAL | Age: 63
End: 2023-05-30
Payer: MEDICAID

## 2023-06-19 ENCOUNTER — PATIENT OUTREACH (OUTPATIENT)
Dept: ADMINISTRATIVE | Facility: HOSPITAL | Age: 63
End: 2023-06-19
Payer: MEDICAID

## 2023-06-19 NOTE — PROGRESS NOTES
Population Health. [facility]. Reviewing patient's chart for quality metrics. I contacted patient to see if she has had a recent mmg or colonoscopy. Patients care giver reports she has not had either test.

## 2023-07-01 DIAGNOSIS — T78.40XA ALLERGY, INITIAL ENCOUNTER: ICD-10-CM

## 2023-07-03 RX ORDER — CETIRIZINE HYDROCHLORIDE 10 MG/1
TABLET ORAL
Qty: 30 TABLET | Refills: 3 | Status: SHIPPED | OUTPATIENT
Start: 2023-07-03 | End: 2023-11-21 | Stop reason: SDUPTHER

## 2023-07-05 ENCOUNTER — PATIENT MESSAGE (OUTPATIENT)
Dept: ADMINISTRATIVE | Facility: HOSPITAL | Age: 63
End: 2023-07-05
Payer: MEDICAID

## 2023-07-17 DIAGNOSIS — Z00.00 WELLNESS EXAMINATION: Primary | ICD-10-CM

## 2023-07-24 ENCOUNTER — PATIENT MESSAGE (OUTPATIENT)
Dept: ADMINISTRATIVE | Facility: HOSPITAL | Age: 63
End: 2023-07-24
Payer: MEDICAID

## 2023-08-29 ENCOUNTER — PATIENT MESSAGE (OUTPATIENT)
Dept: ADMINISTRATIVE | Facility: HOSPITAL | Age: 63
End: 2023-08-29
Payer: MEDICAID

## 2023-11-07 DIAGNOSIS — Z12.11 COLON CANCER SCREENING: ICD-10-CM

## 2023-11-07 DIAGNOSIS — Z12.39 ENCOUNTER FOR SCREENING FOR MALIGNANT NEOPLASM OF BREAST, UNSPECIFIED SCREENING MODALITY: ICD-10-CM

## 2023-11-07 DIAGNOSIS — Z00.00 WELLNESS EXAMINATION: Primary | ICD-10-CM

## 2023-11-14 ENCOUNTER — LAB VISIT (OUTPATIENT)
Dept: LAB | Facility: HOSPITAL | Age: 63
End: 2023-11-14
Attending: NURSE PRACTITIONER
Payer: MEDICAID

## 2023-11-14 DIAGNOSIS — Z00.00 WELLNESS EXAMINATION: ICD-10-CM

## 2023-11-14 LAB
ALBUMIN SERPL-MCNC: 4.3 G/DL (ref 3.4–4.8)
ALBUMIN/GLOB SERPL: 1.1 RATIO (ref 1.1–2)
ALP SERPL-CCNC: 60 UNIT/L (ref 40–150)
ALT SERPL-CCNC: 19 UNIT/L (ref 0–55)
AST SERPL-CCNC: 16 UNIT/L (ref 5–34)
BASOPHILS # BLD AUTO: 0.03 X10(3)/MCL
BASOPHILS NFR BLD AUTO: 0.6 %
BILIRUB SERPL-MCNC: 0.6 MG/DL
BUN SERPL-MCNC: 9 MG/DL (ref 9.8–20.1)
CALCIUM SERPL-MCNC: 9.8 MG/DL (ref 8.4–10.2)
CHLORIDE SERPL-SCNC: 99 MMOL/L (ref 98–107)
CHOLEST SERPL-MCNC: 187 MG/DL
CHOLEST/HDLC SERPL: 4 {RATIO} (ref 0–5)
CO2 SERPL-SCNC: 27 MMOL/L (ref 23–31)
CREAT SERPL-MCNC: 0.66 MG/DL (ref 0.55–1.02)
DEPRECATED CALCIDIOL+CALCIFEROL SERPL-MC: 40.9 NG/ML (ref 30–80)
EOSINOPHIL # BLD AUTO: 0.06 X10(3)/MCL (ref 0–0.9)
EOSINOPHIL NFR BLD AUTO: 1.2 %
ERYTHROCYTE [DISTWIDTH] IN BLOOD BY AUTOMATED COUNT: 13.2 % (ref 11.5–17)
EST. AVERAGE GLUCOSE BLD GHB EST-MCNC: 96.8 MG/DL
GFR SERPLBLD CREATININE-BSD FMLA CKD-EPI: >60 MLS/MIN/1.73/M2
GLOBULIN SER-MCNC: 3.8 GM/DL (ref 2.4–3.5)
GLUCOSE SERPL-MCNC: 103 MG/DL (ref 82–115)
HBA1C MFR BLD: 5 %
HCT VFR BLD AUTO: 41.5 % (ref 37–47)
HCV AB SERPL QL IA: REACTIVE
HDLC SERPL-MCNC: 48 MG/DL (ref 35–60)
HGB BLD-MCNC: 14.1 G/DL (ref 12–16)
HIV 1+2 AB+HIV1 P24 AG SERPL QL IA: NONREACTIVE
IMM GRANULOCYTES # BLD AUTO: 0.01 X10(3)/MCL (ref 0–0.04)
IMM GRANULOCYTES NFR BLD AUTO: 0.2 %
LDLC SERPL CALC-MCNC: 119 MG/DL (ref 50–140)
LYMPHOCYTES # BLD AUTO: 1.47 X10(3)/MCL (ref 0.6–4.6)
LYMPHOCYTES NFR BLD AUTO: 29.8 %
MCH RBC QN AUTO: 31.8 PG (ref 27–31)
MCHC RBC AUTO-ENTMCNC: 34 G/DL (ref 33–36)
MCV RBC AUTO: 93.5 FL (ref 80–94)
MONOCYTES # BLD AUTO: 0.43 X10(3)/MCL (ref 0.1–1.3)
MONOCYTES NFR BLD AUTO: 8.7 %
NEUTROPHILS # BLD AUTO: 2.94 X10(3)/MCL (ref 2.1–9.2)
NEUTROPHILS NFR BLD AUTO: 59.5 %
PLATELET # BLD AUTO: 158 X10(3)/MCL (ref 130–400)
PMV BLD AUTO: 9.4 FL (ref 7.4–10.4)
POTASSIUM SERPL-SCNC: 4.3 MMOL/L (ref 3.5–5.1)
PROT SERPL-MCNC: 8.1 GM/DL (ref 5.8–7.6)
RBC # BLD AUTO: 4.44 X10(6)/MCL (ref 4.2–5.4)
SODIUM SERPL-SCNC: 134 MMOL/L (ref 136–145)
TRIGL SERPL-MCNC: 101 MG/DL (ref 37–140)
TSH SERPL-ACNC: 1.36 UIU/ML (ref 0.35–4.94)
VLDLC SERPL CALC-MCNC: 20 MG/DL
WBC # SPEC AUTO: 4.94 X10(3)/MCL (ref 4.5–11.5)

## 2023-11-14 PROCEDURE — 82306 VITAMIN D 25 HYDROXY: CPT

## 2023-11-14 PROCEDURE — 80053 COMPREHEN METABOLIC PANEL: CPT

## 2023-11-14 PROCEDURE — 80061 LIPID PANEL: CPT

## 2023-11-14 PROCEDURE — 85025 COMPLETE CBC W/AUTO DIFF WBC: CPT

## 2023-11-14 PROCEDURE — 36415 COLL VENOUS BLD VENIPUNCTURE: CPT

## 2023-11-14 PROCEDURE — 83036 HEMOGLOBIN GLYCOSYLATED A1C: CPT

## 2023-11-14 PROCEDURE — 87389 HIV-1 AG W/HIV-1&-2 AB AG IA: CPT

## 2023-11-14 PROCEDURE — 86803 HEPATITIS C AB TEST: CPT

## 2023-11-14 PROCEDURE — 84443 ASSAY THYROID STIM HORMONE: CPT

## 2023-11-20 NOTE — PROGRESS NOTES
Patient ID: [id number]     Chief Complaint: wellness      HPI:     Avelina David is a 63 y.o. female here today for an annual wellness visit. Reviewed and discussed lab results. She has not had bloodwork completed prior to visit as ordered but will do so in the near future. Overall she feels well. No other complaints today.     Sinusitis: Patient presents with chronic sinusitis. The patient reports chronic sinus infections for 3 days.  Her symptoms include nasal congestion, sneezing.  There has not been a history of none of significance. There has not been a history of chronic otitis media or pharyngotonsillitis.  Prior antibiotic therapy has included no recent courses. Other medications have included oral decongestants.  She has not had allergy testing which was not done.         Past Medical History:   Diagnosis Date    Allergy     Brain tumor     Chronic pain     Cognitive decline     Ependymoma     Focal dystonia     Osteoporosis     Plantar fasciitis of left foot     Poor vision     Tobacco user     Urinary frequency     Vitamin D deficiency         Past Surgical History:   Procedure Laterality Date    BRAIN SURGERY  2000    removal of plantar foot  Left         Social History     Socioeconomic History    Marital status: Single   Occupational History    Occupation: disabled    Tobacco Use    Smoking status: Every Day     Current packs/day: 1.00     Average packs/day: 1 pack/day for 41.9 years (41.9 ttl pk-yrs)     Types: Cigarettes     Start date: 1982    Smokeless tobacco: Never   Substance and Sexual Activity    Alcohol use: Not Currently     Alcohol/week: 4.0 standard drinks of alcohol     Types: 4 Cans of beer per week    Drug use: Not Currently     Types: Marijuana    Sexual activity: Never     Partners: Male     Birth control/protection: None, Post-menopausal     Social Determinants of Health     Financial Resource Strain: Low Risk  (11/21/2023)    Overall Financial Resource Strain ([facility])      Difficulty of Paying Living Expenses: Not hard at all   Food Insecurity: No Food Insecurity (11/21/2023)    Hunger Vital Sign     Worried About Running Out of Food in the Last Year: Never true     Ran Out of Food in the Last Year: Never true   Transportation Needs: No Transportation Needs (11/21/2023)    PRAPARE - Transportation     Lack of Transportation (Medical): No     Lack of Transportation (Non-Medical): No   Physical Activity: Insufficiently Active (11/21/2023)    Exercise Vital Sign     Days of Exercise per Week: 1 day     Minutes of Exercise per Session: 10 min   Stress: No Stress Concern Present (11/21/2023)    [place] [employer] - Occupational Stress Questionnaire     Feeling of Stress : Not at all   Social Connections: Moderately Integrated (11/21/2023)    Social Connection and Isolation Panel [NHANES]     Frequency of Communication with Friends and Family: More than three times a week     Frequency of Social Gatherings with Friends and Family: More than three times a week     Attends Alevism Services: More than 4 times per year     Active Member of Clubs or Organizations: Yes     Attends Club or Organization Meetings: More than 4 times per year     Marital Status:    Housing Stability: Low Risk  (11/21/2023)    Housing Stability Vital Sign     Unable to Pay for Housing in the Last Year: No     Number of Places Lived in the Last Year: 1     Unstable Housing in the Last Year: No        Current Outpatient Medications   Medication Instructions    cetirizine (ZYRTEC) 10 mg, Oral, Daily    oxybutynin (DITROPAN-XL) 5 mg, Oral, Every morning    tiZANidine (ZANAFLEX) 4 mg, Oral, 3 times daily    traZODone (DESYREL) 50 mg, Oral, Daily       Review of patient's allergies indicates:  No Known Allergies     Patient Care Team:  Ciarra Reddy ANP as PCP - General (Nurse Practitioner)  Sandy Esquivel MA as Care Coordinator     Subjective:     Review of Systems   Constitutional:   Negative for chills, fatigue and fever.        Patient mobilizes with a walker and left foot brace.    HENT:  Positive for congestion and postnasal drip. Negative for ear discharge, ear pain, sinus pain, sneezing and sore throat.    Eyes:  Negative for pain, discharge and itching.   Respiratory:  Negative for cough, shortness of breath and wheezing.    Cardiovascular:  Negative for chest pain.   Gastrointestinal:  Negative for abdominal pain, nausea and vomiting.   Endocrine: Negative for cold intolerance and heat intolerance.   Genitourinary:  Negative for difficulty urinating, frequency, urgency and vaginal discharge.   Musculoskeletal:         Left foot brace noted   Neurological:  Negative for dizziness, weakness and numbness.   Psychiatric/Behavioral:  Negative for sleep disturbance and suicidal ideas. The patient is not nervous/anxious.    All other systems reviewed and are negative.      12 point review of systems conducted, negative except as stated in the history of present illness. See HPI for details.    Objective:     Visit Vitals  LMP  (LMP Unknown)       Physical Exam  Vitals and nursing note reviewed.   Constitutional:       General: She is not in acute distress.     Appearance: She is not ill-appearing.      Comments: Patient mobilizes with a walker and left foot brace.    HENT:      Head: Normocephalic and atraumatic.      Mouth/Throat:      Mouth: Mucous membranes are moist.      Pharynx: Oropharynx is clear.      Comments: Post nasal drip noted.   Eyes:      General: No scleral icterus.     Extraocular Movements: Extraocular movements intact.      Conjunctiva/sclera: Conjunctivae normal.      Pupils: Pupils are equal, round, and reactive to light.   Neck:      Vascular: No carotid bruit.   Cardiovascular:      Rate and Rhythm: Normal rate and regular rhythm.      Heart sounds: No murmur heard.     No friction rub. No gallop.   Pulmonary:      Effort: Pulmonary effort is normal. No respiratory  distress.      Breath sounds: Normal breath sounds. No wheezing, rhonchi or rales.   Abdominal:      General: Abdomen is flat. Bowel sounds are normal. There is no distension.      Palpations: Abdomen is soft. There is no mass.      Tenderness: There is no abdominal tenderness.   Musculoskeletal:         General: Normal range of motion.      Cervical back: Normal range of motion and neck supple.      Right foot: Normal.      Comments: Left lower leg brace in place.    Skin:     General: Skin is warm and dry.   Neurological:      General: No focal deficit present.      Mental Status: She is alert and oriented to person, place, and time.   Psychiatric:         Mood and Affect: Mood normal.               Labs Reviewed:   The below labs reviewed with the patient. The patient verbalized understanding.     Chemistry:  Lab Results   Component Value Date     (L) 11/14/2023    K 4.3 11/14/2023    CHLORIDE 99 11/14/2023    BUN 9.0 (L) 11/14/2023    CREATININE 0.66 11/14/2023    EGFRNORACEVR >60 11/14/2023    GLUCOSE 103 11/14/2023    CALCIUM 9.8 11/14/2023    ALKPHOS 60 11/14/2023    LABPROT 8.1 (H) 11/14/2023    ALBUMIN 4.3 11/14/2023    BILIDIR 0.2 02/14/2022    IBILI 0.30 02/14/2022    AST 16 11/14/2023    ALT 19 11/14/2023    GEESOQVS55QT 40.9 11/14/2023    TSH 1.361 11/14/2023    SNCAKP4CZJI 0.96 07/01/2022        Lab Results   Component Value Date    HGBA1C 5.0 11/14/2023        Hematology:  Lab Results   Component Value Date    WBC 4.94 11/14/2023    HGB 14.1 11/14/2023    HCT 41.5 11/14/2023     11/14/2023       Lipid Panel:  Lab Results   Component Value Date    CHOL 187 11/14/2023    HDL 48 11/14/2023    .00 11/14/2023    TRIG 101 11/14/2023    TOTALCHOLEST 4 11/14/2023        Assessment:       ICD-10-CM ICD-9-CM   1. Well adult exam  Z00.00 V70.0   2. Hepatitis C antibody positive in blood  R76.8 795.79   3. Smoker  F17.200 305.1   4. Allergy, initial encounter  T78.40XA 995.3   5. Depression,  unspecified depression type  F32.A 311   6. Urinary frequency  R35.0 788.41   7. Left foot drop  M21.372 736.79        Plan:     Eye Exam - Recommend annually.    Dental Exam - Recommend biannual exams.     Vaccinations -   There is no immunization history on file for this patient.       1. Well adult exam    2. Hepatitis C antibody positive in blood  -     Ambulatory referral/consult to Infectious Disease; Future; Expected date: 11/27/2023    3. Smoker  Comments:  Patient denies smoking cessation.    4. Allergy, initial encounter  -     cetirizine (ZYRTEC) 10 MG tablet; Take 1 tablet (10 mg total) by mouth once daily.  Dispense: 30 tablet; Refill: 2    5. Depression, unspecified depression type  -     traZODone (DESYREL) 50 MG tablet; Take 1 tablet (50 mg total) by mouth once daily.  Dispense: 30 tablet; Refill: 2    6. Urinary frequency  -     oxybutynin (DITROPAN-XL) 5 MG TR24; Take 1 tablet (5 mg total) by mouth every morning.  Dispense: 30 tablet; Refill: 2    7. Left foot drop  -     Ambulatory referral/consult to Orthopedics; Future; Expected date: 11/28/2023         Follow up in about 1 year (around 11/21/2024), or if symptoms worsen or fail to improve, for Wellness Visit, Keep Next Appointment with Clinic. In addition to their scheduled follow up, the patient has also been instructed to follow up on as needed basis.     Future Appointments   Date Time Provider Department Center   11/26/2024 10:20 AM PROVIDER, [facility] [place] [facility] [place]

## 2023-11-21 ENCOUNTER — OFFICE VISIT (OUTPATIENT)
Dept: FAMILY MEDICINE | Facility: CLINIC | Age: 63
End: 2023-11-21
Payer: MEDICAID

## 2023-11-21 DIAGNOSIS — M21.372 LEFT FOOT DROP: ICD-10-CM

## 2023-11-21 DIAGNOSIS — T78.40XA ALLERGY, INITIAL ENCOUNTER: ICD-10-CM

## 2023-11-21 DIAGNOSIS — Z00.00 WELL ADULT EXAM: Primary | ICD-10-CM

## 2023-11-21 DIAGNOSIS — F17.200 SMOKER: ICD-10-CM

## 2023-11-21 DIAGNOSIS — F32.A DEPRESSION, UNSPECIFIED DEPRESSION TYPE: ICD-10-CM

## 2023-11-21 DIAGNOSIS — R35.0 URINARY FREQUENCY: ICD-10-CM

## 2023-11-21 DIAGNOSIS — R76.8 HEPATITIS C ANTIBODY POSITIVE IN BLOOD: ICD-10-CM

## 2023-11-21 PROCEDURE — 1160F PR REVIEW ALL MEDS BY PRESCRIBER/CLIN PHARMACIST DOCUMENTED: ICD-10-PCS | Mod: CPTII,,, | Performed by: NURSE PRACTITIONER

## 2023-11-21 PROCEDURE — 99396 PREV VISIT EST AGE 40-64: CPT | Mod: S$PBB,,, | Performed by: NURSE PRACTITIONER

## 2023-11-21 PROCEDURE — 99213 OFFICE O/P EST LOW 20 MIN: CPT | Mod: PBBFAC | Performed by: NURSE PRACTITIONER

## 2023-11-21 PROCEDURE — 3044F PR MOST RECENT HEMOGLOBIN A1C LEVEL <7.0%: ICD-10-PCS | Mod: CPTII,,, | Performed by: NURSE PRACTITIONER

## 2023-11-21 PROCEDURE — 99999 PR PBB SHADOW E&M-EST. PATIENT-LVL III: ICD-10-PCS | Mod: PBBFAC,,, | Performed by: NURSE PRACTITIONER

## 2023-11-21 PROCEDURE — 1160F RVW MEDS BY RX/DR IN RCRD: CPT | Mod: CPTII,,, | Performed by: NURSE PRACTITIONER

## 2023-11-21 PROCEDURE — 1159F MED LIST DOCD IN RCRD: CPT | Mod: CPTII,,, | Performed by: NURSE PRACTITIONER

## 2023-11-21 PROCEDURE — 99396 PR PREVENTIVE VISIT,EST,40-64: ICD-10-PCS | Mod: S$PBB,,, | Performed by: NURSE PRACTITIONER

## 2023-11-21 PROCEDURE — 3044F HG A1C LEVEL LT 7.0%: CPT | Mod: CPTII,,, | Performed by: NURSE PRACTITIONER

## 2023-11-21 PROCEDURE — 1159F PR MEDICATION LIST DOCUMENTED IN MEDICAL RECORD: ICD-10-PCS | Mod: CPTII,,, | Performed by: NURSE PRACTITIONER

## 2023-11-21 PROCEDURE — 99999 PR PBB SHADOW E&M-EST. PATIENT-LVL III: CPT | Mod: PBBFAC,,, | Performed by: NURSE PRACTITIONER

## 2023-11-21 RX ORDER — CETIRIZINE HYDROCHLORIDE 10 MG/1
10 TABLET ORAL DAILY
Qty: 30 TABLET | Refills: 2 | Status: SHIPPED | OUTPATIENT
Start: 2023-11-21 | End: 2024-02-29 | Stop reason: SDUPTHER

## 2023-11-21 RX ORDER — OXYBUTYNIN CHLORIDE 5 MG/1
5 TABLET, EXTENDED RELEASE ORAL EVERY MORNING
Qty: 30 TABLET | Refills: 2 | Status: SHIPPED | OUTPATIENT
Start: 2023-11-21 | End: 2024-02-29 | Stop reason: SDUPTHER

## 2023-11-21 RX ORDER — TRAZODONE HYDROCHLORIDE 50 MG/1
50 TABLET ORAL DAILY
Qty: 30 TABLET | Refills: 2 | Status: SHIPPED | OUTPATIENT
Start: 2023-11-21 | End: 2024-02-29 | Stop reason: SDUPTHER

## 2023-11-26 LAB — NONINV COLON CA DNA+OCC BLD SCRN STL QL: NORMAL

## 2023-11-27 ENCOUNTER — TELEPHONE (OUTPATIENT)
Dept: FAMILY MEDICINE | Facility: CLINIC | Age: 63
End: 2023-11-27
Payer: MEDICAID

## 2023-11-27 NOTE — TELEPHONE ENCOUNTER
----- Message from CL Mo sent at 11/27/2023 11:17 AM CST -----  Regarding: Cologuard Test  Hello,     Can you call the patient and let them know that they will send another cologuard test. The sample passed the stability threshold.     Thanks,   ----- Message -----  From: Juana Hall  Sent: 11/26/2023   9:06 AM CST  To: CL Mo

## 2024-02-29 ENCOUNTER — TELEPHONE (OUTPATIENT)
Dept: FAMILY MEDICINE | Facility: CLINIC | Age: 64
End: 2024-02-29
Payer: MEDICAID

## 2024-02-29 DIAGNOSIS — R35.0 URINARY FREQUENCY: ICD-10-CM

## 2024-02-29 DIAGNOSIS — T78.40XA ALLERGY, INITIAL ENCOUNTER: ICD-10-CM

## 2024-02-29 DIAGNOSIS — F32.A DEPRESSION, UNSPECIFIED DEPRESSION TYPE: ICD-10-CM

## 2024-02-29 RX ORDER — TRAZODONE HYDROCHLORIDE 50 MG/1
50 TABLET ORAL DAILY
Qty: 30 TABLET | Refills: 2 | Status: SHIPPED | OUTPATIENT
Start: 2024-02-29 | End: 2024-05-13 | Stop reason: SDUPTHER

## 2024-02-29 RX ORDER — OXYBUTYNIN CHLORIDE 5 MG/1
5 TABLET, EXTENDED RELEASE ORAL EVERY MORNING
Qty: 30 TABLET | Refills: 2 | Status: SHIPPED | OUTPATIENT
Start: 2024-02-29 | End: 2024-05-13 | Stop reason: SDUPTHER

## 2024-02-29 RX ORDER — CETIRIZINE HYDROCHLORIDE 10 MG/1
10 TABLET ORAL DAILY
Qty: 30 TABLET | Refills: 2 | Status: SHIPPED | OUTPATIENT
Start: 2024-02-29 | End: 2024-05-13 | Stop reason: ALTCHOICE

## 2024-02-29 NOTE — TELEPHONE ENCOUNTER
----- Message from Maty Mancia sent at 2/29/2024 11:09 AM CST -----  Regarding: refill x3  .Type:  RX Refill Request    Who Called: pt  Refill or New Rx:cetirizine (ZYRTEC) 10 MG tablet - oxybutynin (DITROPAN-XL) 5 MG TR24  traZODone (DESYREL) 50 MG tablet  Preferred Pharmacy with phone number:[facility] [place] - [address]

## 2024-04-22 ENCOUNTER — TELEPHONE (OUTPATIENT)
Dept: FAMILY MEDICINE | Facility: CLINIC | Age: 64
End: 2024-04-22
Payer: MEDICAID

## 2024-04-22 NOTE — TELEPHONE ENCOUNTER
----- Message from Sonal Fermin sent at 4/22/2024 10:37 AM CDT -----  Type:  Needs Medical Advice    Who Called: gerry (care giver)     Symptoms (please be specific):      How long has patient had these symptoms:      Pharmacy name and phone #:        Would the patient rather a call back or a response via [facility]? Call back     Best Call Back Number: [phone]    Additional Information: pt's care giver states the pt was seen not that long ago and would like to speak to clinic staff

## 2024-04-22 NOTE — TELEPHONE ENCOUNTER
Informed Care Giver that she has not been here since November and because of the medications she is on she needs to be seen before refills can be given. She stated she may have to reschedule since transportation has to be given 3 days in advance.

## 2024-05-13 ENCOUNTER — OFFICE VISIT (OUTPATIENT)
Dept: FAMILY MEDICINE | Facility: CLINIC | Age: 64
End: 2024-05-13
Payer: MEDICAID

## 2024-05-13 VITALS
DIASTOLIC BLOOD PRESSURE: 72 MMHG | OXYGEN SATURATION: 98 % | RESPIRATION RATE: 20 BRPM | HEIGHT: 62 IN | HEART RATE: 64 BPM | BODY MASS INDEX: 24.99 KG/M2 | TEMPERATURE: 98 F | SYSTOLIC BLOOD PRESSURE: 117 MMHG | WEIGHT: 135.81 LBS

## 2024-05-13 DIAGNOSIS — Z23 NEED FOR VACCINATION WITH 20-POLYVALENT PNEUMOCOCCAL CONJUGATE VACCINE: ICD-10-CM

## 2024-05-13 DIAGNOSIS — F32.A DEPRESSION, UNSPECIFIED DEPRESSION TYPE: ICD-10-CM

## 2024-05-13 DIAGNOSIS — J32.1 SINUSITIS CHRONIC, FRONTAL: Chronic | ICD-10-CM

## 2024-05-13 DIAGNOSIS — Z12.11 SCREENING FOR COLON CANCER: ICD-10-CM

## 2024-05-13 DIAGNOSIS — R35.0 URINARY FREQUENCY: ICD-10-CM

## 2024-05-13 DIAGNOSIS — Z72.0 TOBACCO USER: Primary | Chronic | ICD-10-CM

## 2024-05-13 DIAGNOSIS — G47.00 INSOMNIA, UNSPECIFIED TYPE: Chronic | ICD-10-CM

## 2024-05-13 DIAGNOSIS — E55.9 VITAMIN D DEFICIENCY: Chronic | ICD-10-CM

## 2024-05-13 PROCEDURE — 99214 OFFICE O/P EST MOD 30 MIN: CPT | Mod: S$PBB,,, | Performed by: NURSE PRACTITIONER

## 2024-05-13 PROCEDURE — 3008F BODY MASS INDEX DOCD: CPT | Mod: CPTII,,, | Performed by: NURSE PRACTITIONER

## 2024-05-13 PROCEDURE — 1159F MED LIST DOCD IN RCRD: CPT | Mod: CPTII,,, | Performed by: NURSE PRACTITIONER

## 2024-05-13 PROCEDURE — 3078F DIAST BP <80 MM HG: CPT | Mod: CPTII,,, | Performed by: NURSE PRACTITIONER

## 2024-05-13 PROCEDURE — 1160F RVW MEDS BY RX/DR IN RCRD: CPT | Mod: CPTII,,, | Performed by: NURSE PRACTITIONER

## 2024-05-13 PROCEDURE — 99215 OFFICE O/P EST HI 40 MIN: CPT | Mod: PBBFAC | Performed by: NURSE PRACTITIONER

## 2024-05-13 PROCEDURE — 99999 PR PBB SHADOW E&M-EST. PATIENT-LVL V: CPT | Mod: PBBFAC,,, | Performed by: NURSE PRACTITIONER

## 2024-05-13 PROCEDURE — 3074F SYST BP LT 130 MM HG: CPT | Mod: CPTII,,, | Performed by: NURSE PRACTITIONER

## 2024-05-13 RX ORDER — TRAZODONE HYDROCHLORIDE 100 MG/1
100 TABLET ORAL DAILY
Qty: 30 TABLET | Refills: 3 | Status: SHIPPED | OUTPATIENT
Start: 2024-05-13

## 2024-05-13 RX ORDER — LEVOCETIRIZINE DIHYDROCHLORIDE 5 MG/1
5 TABLET, FILM COATED ORAL NIGHTLY
Qty: 30 TABLET | Refills: 11 | Status: SHIPPED | OUTPATIENT
Start: 2024-05-13 | End: 2025-05-13

## 2024-05-13 RX ORDER — OXYBUTYNIN CHLORIDE 5 MG/1
5 TABLET, EXTENDED RELEASE ORAL EVERY MORNING
Qty: 30 TABLET | Refills: 2 | Status: SHIPPED | OUTPATIENT
Start: 2024-05-13

## 2024-05-13 NOTE — PROGRESS NOTES
Family Medicine    Patient ID: [id number]     Chief Complaint: Medication Refill (Trazodone, oxybutynin and wants to switch to xyzal instead of zyrtec)      HPI:     Avelina David is here today for follow-up or Cologuard testing, and for medication refills.  Advised patient to call for a new kit.    Past Medical History:   Diagnosis Date    Allergy     Brain tumor     Chronic pain     Cognitive decline     Ependymoma     Focal dystonia     Osteoporosis     Plantar fasciitis of left foot     Poor vision     Tobacco user     Urinary frequency     Vitamin D deficiency         Past Surgical History:   Procedure Laterality Date    BRAIN SURGERY  2000    removal of plantar foot  Left         Social History     Tobacco Use    Smoking status: Every Day     Current packs/day: 1.00     Average packs/day: 1 pack/day for 42.4 years (42.4 ttl pk-yrs)     Types: Cigarettes     Start date: 1982    Smokeless tobacco: Never   Substance and Sexual Activity    Alcohol use: Not Currently     Alcohol/week: 4.0 standard drinks of alcohol     Types: 4 Cans of beer per week    Drug use: Not Currently     Types: Marijuana    Sexual activity: Never     Partners: Male     Birth control/protection: None, Post-menopausal        Current Outpatient Medications   Medication Instructions    levocetirizine (XYZAL) 5 mg, Oral, Nightly    oxybutynin (DITROPAN-XL) 5 mg, Oral, Every morning    traZODone (DESYREL) 100 mg, Oral, Daily       Review of patient's allergies indicates:  No Known Allergies     Patient Care Team:  Sandy Garcia FNP as PCP - General (Family Medicine)  Sandy Esquivel MA as Care Coordinator     Subjective:     Review of Systems   Constitutional:  Negative for appetite change, chills, diaphoresis and fever.   HENT:  Negative for ear pain, sinus pain and sore throat.    Eyes:  Negative for pain and visual disturbance.   Respiratory:  Negative for cough, shortness of breath and wheezing.    Cardiovascular:  Negative for chest pain,  "palpitations and leg swelling.   Gastrointestinal:  Negative for abdominal pain, blood in stool, diarrhea, nausea and vomiting.   Endocrine: Negative for cold intolerance.   Genitourinary:  Negative for difficulty urinating, dysuria, frequency and hematuria.   Musculoskeletal:  Negative for arthralgias, joint swelling and myalgias.   Skin:  Negative for color change and rash.   Allergic/Immunologic: Negative.    Neurological: Negative.  Negative for dizziness, syncope, light-headedness and numbness.   Hematological: Negative.    Psychiatric/Behavioral: Negative.  Negative for dysphoric mood and suicidal ideas. The patient is not nervous/anxious.    All other systems reviewed and are negative.      12 point review of systems conducted, negative except as stated in the history of present illness. See HPI for details.    Objective:     Visit Vitals  /72   Pulse 64   Temp 97.8 °F (36.6 °C)   Resp 20   Ht 5' 2" (1.575 m)   Wt 61.6 kg (135 lb 12.8 oz)   LMP  (LMP Unknown)   SpO2 98%   BMI 24.84 kg/m²       Physical Exam  Vitals and nursing note reviewed.   Constitutional:       General: She is not in acute distress.     Appearance: She is not ill-appearing.   HENT:      Head: Normocephalic and atraumatic.      Mouth/Throat:      Mouth: Mucous membranes are moist.      Pharynx: Oropharynx is clear.   Eyes:      General: No scleral icterus.     Extraocular Movements: Extraocular movements intact.      Conjunctiva/sclera: Conjunctivae normal.      Pupils: Pupils are equal, round, and reactive to light.   Neck:      Vascular: No carotid bruit.   Cardiovascular:      Rate and Rhythm: Normal rate and regular rhythm.      Heart sounds: No murmur heard.     No friction rub. No gallop.   Pulmonary:      Effort: Pulmonary effort is normal. No respiratory distress.      Breath sounds: Normal breath sounds. No wheezing, rhonchi or rales.   Abdominal:      General: Abdomen is flat. Bowel sounds are normal. There is no " "distension.      Palpations: Abdomen is soft. There is no mass.      Tenderness: There is no abdominal tenderness.   Musculoskeletal:         General: Normal range of motion.      Cervical back: Normal range of motion and neck supple.   Skin:     General: Skin is warm and dry.   Neurological:      General: No focal deficit present.      Mental Status: She is alert.   Psychiatric:         Mood and Affect: Mood normal.         Labs Reviewed:   Labs reviewed with patient, verbalized understanding.  Chemistry:  Lab Results   Component Value Date     (L) 11/14/2023    K 4.3 11/14/2023    CHLORIDE 99 11/14/2023    BUN 9.0 (L) 11/14/2023    CREATININE 0.66 11/14/2023    EGFRNORACEVR >60 11/14/2023    GLUCOSE 103 11/14/2023    CALCIUM 9.8 11/14/2023    ALKPHOS 60 11/14/2023    LABPROT 8.1 (H) 11/14/2023    ALBUMIN 4.3 11/14/2023    BILIDIR 0.2 02/14/2022    IBILI 0.30 02/14/2022    AST 16 11/14/2023    ALT 19 11/14/2023    [id number] 40.9 11/14/2023    TSH 1.361 11/14/2023    FOCFMK8YMHB 0.96 07/01/2022        Lab Results   Component Value Date    HGBA1C 5.0 11/14/2023        Hematology:  Lab Results   Component Value Date    WBC 4.94 11/14/2023    HGB 14.1 11/14/2023    HCT 41.5 11/14/2023     11/14/2023       Lipid Panel:  Lab Results   Component Value Date    CHOL 187 11/14/2023    HDL 48 11/14/2023    .00 11/14/2023    TRIG 101 11/14/2023    TOTALCHOLEST 4 11/14/2023        Urine:  No results found for: "COLORUA", "APPEARANCEUA", "SGUA", "PHUA", "PROTEINUA", "GLUCOSEUA", "KETONESUA", "BLOODUA", "NITRITESUA", "LEUKOCYTESUR", "RBCUA", "WBCUA", "BACTERIA", "SQEPUA", "HYALINECASTS", "CREATRANDUR", "PROTEINURINE", "UPROTCREA"     Assessment:       ICD-10-CM ICD-9-CM   1. Tobacco user  Z72.0 305.1   2. Screening for colon cancer  Z12.11 V76.51   3. Need for vaccination with 20-polyvalent pneumococcal conjugate vaccine  Z23 V03.82   4. Depression, unspecified depression type  F32.A 311   5. Insomnia, " unspecified type  G47.00 780.52   6. Urinary frequency  R35.0 788.41   7. Sinusitis chronic, frontal  J32.1 473.1   8. Vitamin D deficiency  E55.9 268.9        Plan:     1. Tobacco user  Overview:  Smokes 1 pack of cigarettes daily, times greater than 30 years.  Low-dose CT of the chest is ordered.  Advised that she needs to get this done, all central scheduling to get it scheduled.    Orders:  -     CT Chest Lung Screening Low Dose; Future; Expected date: 05/13/2024    2. Screening for colon cancer    3. Need for vaccination with 20-polyvalent pneumococcal conjugate vaccine  Overview:  Patient refused pneumococcal 23 polyvalent vaccine.      4. Depression, unspecified depression type    5. Insomnia, unspecified type  Overview:  Complain a, discussed sleep hygiene, sleeps with the TV on.  I advised she take the GB out of the bedroom.  Trazodone dose is increased.    Orders:  -     traZODone (DESYREL) 100 MG tablet; Take 1 tablet (100 mg total) by mouth once daily.  Dispense: 30 tablet; Refill: 3  -     CBC Auto Differential; Future; Expected date: 05/13/2024  -     Comprehensive Metabolic Panel; Future; Expected date: 05/13/2024  -     TSH; Future; Expected date: 05/13/2024    6. Urinary frequency  -     oxybutynin (DITROPAN-XL) 5 MG TR24; Take 1 tablet (5 mg total) by mouth every morning.  Dispense: 30 tablet; Refill: 2    7. Sinusitis chronic, frontal  Overview:  Complaint of front dull sinus issues.  Has been taking Zyrtec, states she has been using 2 sometimes 3 daily.  Advise that she no longer take the Zyrtec and to please take Xyzal just 1 tablet daily do not hold take more than that.    Orders:  -     levocetirizine (XYZAL) 5 MG tablet; Take 1 tablet (5 mg total) by mouth every evening.  Dispense: 30 tablet; Refill: 11    8. Vitamin D deficiency  Overview:  Has history of vitamin-D deficiency.  We will order testing.    Orders:  -     Vitamin D; Future; Expected date: 05/13/2024         Follow up in about 6  months (around 11/13/2024) for routine follow up. In addition to their scheduled follow up, the patient has also been instructed to follow up on as needed basis.     Future Appointments   Date Time Provider Department Center   11/13/2024  1:45 PM Cox, Lori, FNP [facility]   11/26/2024  2:00 PM Cox, Lori, FNP [facility] Lori Cox, FNP

## 2024-06-26 ENCOUNTER — HOSPITAL ENCOUNTER (OUTPATIENT)
Dept: RADIOLOGY | Facility: HOSPITAL | Age: 64
Discharge: HOME | End: 2024-06-26
Attending: NURSE PRACTITIONER
Payer: MEDICAID

## 2024-06-26 DIAGNOSIS — Z12.39 ENCOUNTER FOR SCREENING FOR MALIGNANT NEOPLASM OF BREAST, UNSPECIFIED SCREENING MODALITY: ICD-10-CM

## 2024-06-26 DIAGNOSIS — Z87.891 PERSONAL HISTORY OF SMOKING: ICD-10-CM

## 2024-06-26 PROCEDURE — 71271 CT THORAX LUNG CANCER SCR C-: CPT | Mod: TC

## 2024-06-26 PROCEDURE — 77067 SCR MAMMO BI INCL CAD: CPT | Mod: TC

## 2024-10-03 DIAGNOSIS — G47.00 INSOMNIA, UNSPECIFIED TYPE: Chronic | ICD-10-CM

## 2024-10-03 RX ORDER — TRAZODONE HYDROCHLORIDE 100 MG/1
100 TABLET ORAL DAILY
Qty: 30 TABLET | Refills: 3 | Status: SHIPPED | OUTPATIENT
Start: 2024-10-03

## 2024-10-04 DIAGNOSIS — R35.0 URINARY FREQUENCY: ICD-10-CM

## 2024-10-04 RX ORDER — OXYBUTYNIN CHLORIDE 5 MG/1
5 TABLET, EXTENDED RELEASE ORAL EVERY MORNING
Qty: 30 TABLET | Refills: 2 | Status: SHIPPED | OUTPATIENT
Start: 2024-10-04

## 2025-08-28 ENCOUNTER — HOSPITAL ENCOUNTER (OUTPATIENT)
Dept: RADIOLOGY | Facility: HOSPITAL | Age: 65
Discharge: HOME | End: 2025-08-28
Attending: FAMILY MEDICINE
Payer: MEDICAID

## 2025-08-28 DIAGNOSIS — M21.6X2 ACQUIRED HEEL VARUS, LEFT: ICD-10-CM

## 2025-08-28 PROCEDURE — 73610 X-RAY EXAM OF ANKLE: CPT | Mod: TC,LT

## 2025-08-28 PROCEDURE — 73630 X-RAY EXAM OF FOOT: CPT | Mod: TC,LT
